# Patient Record
Sex: FEMALE | Race: WHITE | URBAN - METROPOLITAN AREA
[De-identification: names, ages, dates, MRNs, and addresses within clinical notes are randomized per-mention and may not be internally consistent; named-entity substitution may affect disease eponyms.]

---

## 2022-05-25 ENCOUNTER — OFFICE VISIT (OUTPATIENT)
Dept: URBAN - METROPOLITAN AREA CLINIC 10 | Facility: CLINIC | Age: 27
End: 2022-05-25
Payer: COMMERCIAL

## 2022-05-25 DIAGNOSIS — H04.123 DRY EYE SYNDROME OF BILATERAL LACRIMAL GLANDS: Primary | ICD-10-CM

## 2022-05-25 DIAGNOSIS — H52.223 REGULAR ASTIGMATISM, BILATERAL: ICD-10-CM

## 2022-05-25 PROCEDURE — 92004 COMPRE OPH EXAM NEW PT 1/>: CPT | Performed by: STUDENT IN AN ORGANIZED HEALTH CARE EDUCATION/TRAINING PROGRAM

## 2022-05-25 ASSESSMENT — INTRAOCULAR PRESSURE
OD: 10
OS: 10

## 2022-05-25 ASSESSMENT — VISUAL ACUITY
OD: 20/20
OS: 20/20

## 2022-05-25 NOTE — IMPRESSION/PLAN
Impression: Regular astigmatism, bilateral: H52.223. Plan: Patient educated on condition, updated and released SRx. Monitor.